# Patient Record
Sex: FEMALE | Race: WHITE | Employment: FULL TIME
[De-identification: names, ages, dates, MRNs, and addresses within clinical notes are randomized per-mention and may not be internally consistent; named-entity substitution may affect disease eponyms.]

---

## 2017-01-04 ENCOUNTER — OFFICE VISIT (OUTPATIENT)
Dept: FAMILY MEDICINE CLINIC | Facility: CLINIC | Age: 32
End: 2017-01-04

## 2017-01-04 VITALS
OXYGEN SATURATION: 97 % | TEMPERATURE: 97.7 F | DIASTOLIC BLOOD PRESSURE: 72 MMHG | WEIGHT: 180 LBS | SYSTOLIC BLOOD PRESSURE: 127 MMHG | BODY MASS INDEX: 33.99 KG/M2 | HEIGHT: 61 IN | HEART RATE: 72 BPM

## 2017-01-04 DIAGNOSIS — J20.9 ACUTE BRONCHITIS, UNSPECIFIED ORGANISM: Primary | ICD-10-CM

## 2017-01-04 PROCEDURE — 99214 OFFICE O/P EST MOD 30 MIN: CPT | Performed by: PHYSICIAN ASSISTANT

## 2017-01-04 RX ORDER — METHYLPREDNISOLONE 4 MG/1
TABLET ORAL
Qty: 1 KIT | Refills: 0 | Status: SHIPPED | OUTPATIENT
Start: 2017-01-04 | End: 2017-01-10

## 2017-01-04 ASSESSMENT — ENCOUNTER SYMPTOMS
COUGH: 0
SHORTNESS OF BREATH: 0
RHINORRHEA: 1
SWOLLEN GLANDS: 0
SINUS PRESSURE: 1
SORE THROAT: 0

## 2017-11-06 DIAGNOSIS — R05.9 COUGH: ICD-10-CM

## 2017-11-06 DIAGNOSIS — J01.90 ACUTE SINUSITIS, RECURRENCE NOT SPECIFIED, UNSPECIFIED LOCATION: ICD-10-CM

## 2017-11-06 RX ORDER — AZITHROMYCIN 250 MG/1
TABLET, FILM COATED ORAL
Qty: 6 TABLET | Refills: 0 | OUTPATIENT
Start: 2017-11-06

## 2021-08-02 ENCOUNTER — HOSPITAL ENCOUNTER (OUTPATIENT)
Dept: CT IMAGING | Age: 36
Discharge: HOME | End: 2021-08-04
Payer: COMMERCIAL

## 2021-08-02 DIAGNOSIS — J34.2 DEVIATED NASAL SEPTUM: ICD-10-CM

## 2021-08-02 DIAGNOSIS — R09.81 NASAL CONGESTION: ICD-10-CM

## 2021-08-02 DIAGNOSIS — J30.9 ALLERGIC RHINITIS, UNSPECIFIED SEASONALITY, UNSPECIFIED TRIGGER: ICD-10-CM

## 2021-08-02 DIAGNOSIS — J01.41 RECURRENT PANSINUSITIS: ICD-10-CM

## 2021-08-02 DIAGNOSIS — G50.1 ATYPICAL FACE PAIN: ICD-10-CM

## 2021-08-02 PROCEDURE — 70486 CT MAXILLOFACIAL W/O DYE: CPT

## 2025-03-19 ENCOUNTER — APPOINTMENT (OUTPATIENT)
Dept: GENERAL RADIOLOGY | Age: 40
End: 2025-03-19
Payer: COMMERCIAL

## 2025-03-19 ENCOUNTER — HOSPITAL ENCOUNTER (EMERGENCY)
Age: 40
Discharge: HOME | End: 2025-03-19
Attending: EMERGENCY MEDICINE
Payer: COMMERCIAL

## 2025-03-19 VITALS
HEIGHT: 61 IN | TEMPERATURE: 97.5 F | DIASTOLIC BLOOD PRESSURE: 124 MMHG | OXYGEN SATURATION: 97 % | BODY MASS INDEX: 32.1 KG/M2 | HEART RATE: 81 BPM | WEIGHT: 170 LBS | SYSTOLIC BLOOD PRESSURE: 165 MMHG | RESPIRATION RATE: 18 BRPM

## 2025-03-19 DIAGNOSIS — I10 HYPERTENSION, UNSPECIFIED TYPE: Primary | ICD-10-CM

## 2025-03-19 LAB
ALBUMIN SERPL-MCNC: 4.6 G/DL (ref 3.5–5.2)
ALBUMIN/GLOB SERPL: 1.6 {RATIO} (ref 1–2.5)
ALP SERPL-CCNC: 67 U/L (ref 35–104)
ALT SERPL-CCNC: 20 U/L (ref 10–35)
ANION GAP SERPL CALCULATED.3IONS-SCNC: 15 MMOL/L (ref 9–16)
AST SERPL-CCNC: 24 U/L (ref 10–35)
BASOPHILS # BLD: 0.04 K/UL (ref 0–0.2)
BASOPHILS NFR BLD: 1 % (ref 0–2)
BILIRUB SERPL-MCNC: 0.4 MG/DL (ref 0–1.2)
BUN SERPL-MCNC: 9 MG/DL (ref 6–20)
CALCIUM SERPL-MCNC: 9.6 MG/DL (ref 8.6–10.4)
CHLORIDE SERPL-SCNC: 99 MMOL/L (ref 98–107)
CO2 SERPL-SCNC: 22 MMOL/L (ref 20–31)
CREAT SERPL-MCNC: 0.8 MG/DL (ref 0.6–0.9)
EOSINOPHIL # BLD: <0.03 K/UL (ref 0–0.44)
EOSINOPHILS RELATIVE PERCENT: 0 % (ref 1–4)
ERYTHROCYTE [DISTWIDTH] IN BLOOD BY AUTOMATED COUNT: 12.7 % (ref 11.8–14.4)
GFR, ESTIMATED: >90 ML/MIN/1.73M2
GLUCOSE BLD-MCNC: 92 MG/DL (ref 65–105)
GLUCOSE SERPL-MCNC: 93 MG/DL (ref 74–99)
HCT VFR BLD AUTO: 41.4 % (ref 36.3–47.1)
HGB BLD-MCNC: 14.1 G/DL (ref 11.9–15.1)
IMM GRANULOCYTES # BLD AUTO: 0.05 K/UL (ref 0–0.3)
IMM GRANULOCYTES NFR BLD: 1 %
LYMPHOCYTES NFR BLD: 2.84 K/UL (ref 1.1–3.7)
LYMPHOCYTES RELATIVE PERCENT: 36 % (ref 24–43)
MCH RBC QN AUTO: 28.8 PG (ref 25.2–33.5)
MCHC RBC AUTO-ENTMCNC: 34.1 G/DL (ref 28.4–34.8)
MCV RBC AUTO: 84.7 FL (ref 82.6–102.9)
MONOCYTES NFR BLD: 0.63 K/UL (ref 0.1–1.2)
MONOCYTES NFR BLD: 8 % (ref 3–12)
NEUTROPHILS NFR BLD: 54 % (ref 36–65)
NEUTS SEG NFR BLD: 4.26 K/UL (ref 1.5–8.1)
NRBC BLD-RTO: 0 PER 100 WBC
PLATELET # BLD AUTO: 291 K/UL (ref 138–453)
PMV BLD AUTO: 10 FL (ref 8.1–13.5)
POTASSIUM SERPL-SCNC: 3.7 MMOL/L (ref 3.7–5.3)
PROT SERPL-MCNC: 7.5 G/DL (ref 6.6–8.7)
RBC # BLD AUTO: 4.89 M/UL (ref 3.95–5.11)
SODIUM SERPL-SCNC: 136 MMOL/L (ref 136–145)
TROPONIN I SERPL HS-MCNC: <6 NG/L (ref 0–14)
TSH SERPL DL<=0.05 MIU/L-ACNC: 1.33 UIU/ML (ref 0.27–4.2)
WBC OTHER # BLD: 7.8 K/UL (ref 3.5–11.3)

## 2025-03-19 PROCEDURE — 6370000000 HC RX 637 (ALT 250 FOR IP)

## 2025-03-19 PROCEDURE — 84484 ASSAY OF TROPONIN QUANT: CPT

## 2025-03-19 PROCEDURE — 82947 ASSAY GLUCOSE BLOOD QUANT: CPT

## 2025-03-19 PROCEDURE — 93005 ELECTROCARDIOGRAM TRACING: CPT | Performed by: EMERGENCY MEDICINE

## 2025-03-19 PROCEDURE — 85025 COMPLETE CBC W/AUTO DIFF WBC: CPT

## 2025-03-19 PROCEDURE — 99285 EMERGENCY DEPT VISIT HI MDM: CPT

## 2025-03-19 PROCEDURE — 71046 X-RAY EXAM CHEST 2 VIEWS: CPT

## 2025-03-19 PROCEDURE — 80053 COMPREHEN METABOLIC PANEL: CPT

## 2025-03-19 PROCEDURE — 83835 ASSAY OF METANEPHRINES: CPT

## 2025-03-19 PROCEDURE — 84443 ASSAY THYROID STIM HORMONE: CPT

## 2025-03-19 RX ORDER — LORAZEPAM 0.5 MG/1
1 TABLET ORAL ONCE
Status: COMPLETED | OUTPATIENT
Start: 2025-03-19 | End: 2025-03-19

## 2025-03-19 RX ADMIN — LORAZEPAM 1 MG: 0.5 TABLET ORAL at 18:28

## 2025-03-19 NOTE — ED PROVIDER NOTES
[facility] EMERGENCY DEPARTMENT  Emergency Department Encounter  Emergency Medicine Resident     Pt Name:Karen KEENAN  MRN: [id number]  Birthdate 1985  Date of evaluation: 3/19/25  PCP:  Leeann Wheat, SEGUN - CNP  Note Started: 6:20 PM EDT      CHIEF COMPLAINT       Chief Complaint   Patient presents with    Generalized Body Aches    Anxiety       HISTORY OF PRESENT ILLNESS  (Location/Symptom, Timing/Onset, Context/Setting, Quality, Duration, Modifying Factors, Severity.)      Karen KEENAN is a 40 y.o. female who presents with hot flushes, nausea, lightheadedness, seeing black spots in the vision, started last night, checked her blood glucose it was 65, she took sugary food.  Today when she was at workplace, she ate chocolate, drinks soda, again started to have similar feeling if she is going to pass out, according to her, she had similar events before, this is the worst one.  She reports chest pain, started at workplace at around 3 PM, she denies loss of consciousness, focal weakness.  She reports previous episodes of syncope, last episode was 2 years ago, she said they were likely vasovagal in origin.  She has hysterectomy done.  She has a recent workup for thyroid.  She vapes, drinks alcohol 3 times a week on average.  She drank last time yesterday.    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     has a past surgical history that includes Tonsillectomy (2008); Dilation & curettage (2009); [place] tooth extraction (2009); craniotomy (1/2013); laparoscopic appendectomy (2013); and Hysterectomy (2/4/16).      Social History     Socioeconomic History     MD Pete      All EKG's are interpreted by the Emergency Department Physician who either signs or Co-signs this chart in the absence of a cardiologist.    EMERGENCY DEPARTMENT COURSE:    ED Course as of 03/20/25 0834   Wed Mar 19, 2025   1923 TSH, 3rd Generation: 1.33 [TS]   1923 Troponin, High Sensitivity: <6 [TS]   1923 WBC: 7.8 [TS]   1924 Hemoglobin Quant: 14.1 [TS]      ED Course User Index  [TS] Bret Freeman MD       PROCEDURES:  None    CONSULTS:  None    CRITICAL CARE:  There was significant risk of life threatening deterioration of patient's condition requiring my direct management. Critical care time 0 minutes, excluding any documented procedures.    FINAL IMPRESSION      1. Hypertension, unspecified type          DISPOSITION / PLAN     DISPOSITION Decision To Discharge 03/19/2025 07:40:02 PM   DISPOSITION CONDITION Stable           PATIENT REFERRED TO:  Leeann Wheat, APRN - CNP  [address] [address]  [phone]    Schedule an appointment as soon as possible for a visit in 3 days  Call to make follow-up    [facility] Emergency Department  [facility] [address]  [phone]  Go to   If you develop severe chest pain associated with vomiting, shortness of breath, focal weakness, fever, persistent vomiting, nausea, loss of consciousness, come to emergency department      DISCHARGE MEDICATIONS:  Discharge Medication List as of 3/19/2025  7:42 PM          Bret Freeman MD  Emergency Medicine Resident    (Please note that portions of this note were completed with a voice recognition program.  Efforts were made to edit the dictations but occasionally words are mis-transcribed.)

## 2025-03-19 NOTE — ED NOTES
Pt to ED c/o anxiety, generalized body aches, generalized tingling, chest pain, dizziness, and concern for low blood sugar. Pt states she drank a sprite prior to coming to the ED for her blood sugar and on arrival is anxious and c/o chest pain and dizziness. Pt requesting cardiac workup.

## 2025-03-19 NOTE — DISCHARGE INSTRUCTIONS
You are seen today for chest pain, lightheadedness, hot flushes.   Your EKG was normal, blood investigations were within normal limit, plasma metanephrines levels are pending, follow in MyChart.    Schedule appointment with your primary care physician for further follow-up.    Record blood pressures at home at least twice every day, make chart and share with your primary care physician    Follow-up with instructions to come back to emergency department.    If you develop severe chest pain associated with vomiting, shortness of breath, focal weakness, fever, persistent vomiting, nausea, loss of consciousness, come to emergency department

## 2025-03-19 NOTE — ED PROVIDER NOTES
[facility]     Emergency Department     Faculty Attestation    I performed a history and physical examination of the patient and discussed management with the resident. I reviewed the resident's note and agree with the documented findings and plan of care. Any areas of disagreement are noted on the chart. I was personally present for the key portions of any procedures. I have documented in the chart those procedures where I was not present during the key portions. I have reviewed the emergency nurses triage note. I agree with the chief complaint, past medical history, past surgical history, allergies, medications, social and family history as documented unless otherwise noted below. For Physician Assistant/ Nurse Practitioner cases/documentation I have personally evaluated this patient and have completed at least one if not all key elements of the E/M (history, physical exam, and MDM). Additional findings are as noted.      Chest clear, heart exam normal, equal pulses at the wrist.  No pain or swelling in the lower extremities.  Thyroid is not tender or enlarged.  Patient is anxious.     Edis Hollins MD  03/19/25 7903       EKG Interpretation    Interpreted by emergency department physician    Rhythm: normal sinus   Rate: normal/83  Axis: normal/56  Ectopy: none  Conduction: normal  ST Segments: no acute change  T Waves: no acute change  Q Waves: Insignificant inferior  Slow R wave progression    Clinical Impression: Normal EKG except for slow R wave progression, no old EKG available for comparison.    Edis Hollins, Edis Silva MD  03/19/25 7898

## 2025-03-20 LAB
EKG ATRIAL RATE: 83 BPM
EKG P AXIS: 42 DEGREES
EKG P-R INTERVAL: 150 MS
EKG Q-T INTERVAL: 388 MS
EKG QRS DURATION: 78 MS
EKG QTC CALCULATION (BAZETT): 455 MS
EKG R AXIS: 56 DEGREES
EKG T AXIS: 37 DEGREES
EKG VENTRICULAR RATE: 83 BPM

## 2025-03-20 PROCEDURE — 93010 ELECTROCARDIOGRAM REPORT: CPT | Performed by: INTERNAL MEDICINE

## 2025-03-20 ASSESSMENT — ENCOUNTER SYMPTOMS
DIARRHEA: 0
NAUSEA: 1
SHORTNESS OF BREATH: 0
VOMITING: 0

## 2025-03-22 LAB
METANEPH/PLASMA INTERP: NORMAL
METANEPHRINE: 0.16 NMOL/L (ref 0–0.49)
NORMETANEPHRINE PLASMA: 0.47 NMOL/L (ref 0–0.89)